# Patient Record
Sex: MALE | Race: WHITE | NOT HISPANIC OR LATINO | Employment: OTHER | URBAN - METROPOLITAN AREA
[De-identification: names, ages, dates, MRNs, and addresses within clinical notes are randomized per-mention and may not be internally consistent; named-entity substitution may affect disease eponyms.]

---

## 2024-07-18 ENCOUNTER — OFFICE VISIT (OUTPATIENT)
Dept: URGENT CARE | Facility: CLINIC | Age: 84
End: 2024-07-18
Payer: MEDICARE

## 2024-07-18 VITALS
SYSTOLIC BLOOD PRESSURE: 184 MMHG | TEMPERATURE: 98 F | DIASTOLIC BLOOD PRESSURE: 72 MMHG | HEART RATE: 67 BPM | WEIGHT: 204 LBS | BODY MASS INDEX: 28.56 KG/M2 | RESPIRATION RATE: 18 BRPM | OXYGEN SATURATION: 98 % | HEIGHT: 71 IN

## 2024-07-18 DIAGNOSIS — S61.213A LACERATION OF LEFT MIDDLE FINGER WITHOUT FOREIGN BODY WITHOUT DAMAGE TO NAIL, INITIAL ENCOUNTER: Primary | ICD-10-CM

## 2024-07-18 PROCEDURE — 99499 UNLISTED E&M SERVICE: CPT | Mod: ,,, | Performed by: FAMILY MEDICINE

## 2024-07-18 PROCEDURE — 12042 INTMD RPR N-HF/GENIT2.6-7.5: CPT | Mod: ,,, | Performed by: FAMILY MEDICINE

## 2024-07-18 RX ORDER — AMITRIPTYLINE HYDROCHLORIDE 10 MG/1
10 TABLET, FILM COATED ORAL NIGHTLY PRN
COMMUNITY

## 2024-07-18 RX ORDER — CEPHALEXIN 500 MG/1
500 CAPSULE ORAL EVERY 6 HOURS
Qty: 28 CAPSULE | Refills: 0 | Status: SHIPPED | OUTPATIENT
Start: 2024-07-18 | End: 2024-07-25

## 2024-07-18 NOTE — PROGRESS NOTES
"Subjective:      Patient ID: Joshua Vicente is a 83 y.o. male.    Vitals:  height is 5' 11" (1.803 m) and weight is 92.5 kg (204 lb). His temperature is 97.6 °F (36.4 °C). His blood pressure is 184/72 (abnormal) and his pulse is 67. His respiration is 18 and oxygen saturation is 98%.     Chief Complaint: Laceration     Patient is a 83 y.o. male who presents to urgent care with complaints of laceration left hand middle finger  x1 hour.  States he was changing a we will in his hand slipped off the tire iron and hit the wheel itself.  States he is able to move the finger without issue.  Tetanus unknown.  Denies any numbness or tingling of the finger.    Laceration       Constitution: Negative.   HENT: Negative.     Neck: neck negative.   Cardiovascular: Negative.    Eyes: Negative.    Respiratory: Negative.     Gastrointestinal: Negative.    Genitourinary: Negative.    Musculoskeletal: Negative.    Skin: Negative.  Positive for laceration.   Allergic/Immunologic: Negative.    Neurological: Negative.    Hematologic/Lymphatic: Negative.       Objective:     Physical Exam   Constitutional: He is oriented to person, place, and time.  Non-toxic appearance. He does not appear ill. No distress.   HENT:   Head: Normocephalic and atraumatic.   Eyes: Conjunctivae are normal.   Abdominal: Normal appearance.   Musculoskeletal:         General: Signs of injury (laceration involving the 3rd digit of the left hand.  Dorsum.  Extending over the PIP joint towards the MCP joint.  There is loss of skin near the MCP joint.  FROM..  Sensation intact.  Capillary refill less than 2 seconds.) present.   Neurological: He is alert and oriented to person, place, and time.   Skin: Skin is not diaphoretic.   Psychiatric: His behavior is normal. Mood, judgment and thought content normal.   Vitals reviewed.         Previous History      Review of patient's allergies indicates:   Allergen Reactions    Codeine     Fluticasone     Iodine (bulk)      " "Light headed, tinnitis, blurry vision    Proton pump inhibitors     1-iododecane Rash       Past Medical History:   Diagnosis Date    Anxiety     Gastroesophageal reflux disease      Current Outpatient Medications   Medication Instructions    amitriptyline (ELAVIL) 10 mg, Oral, Nightly PRN    cephALEXin (KEFLEX) 500 mg, Oral, Every 6 hours     Past Surgical History:   Procedure Laterality Date    APPENDECTOMY      GALLBLADDER SURGERY       Family History   Problem Relation Name Age of Onset    Heart attack Brother         Social History     Tobacco Use    Smoking status: Former     Current packs/day: 0.00     Types: Cigarettes     Quit date: 12/15/2013     Years since quitting: 10.5    Smokeless tobacco: Never   Substance Use Topics    Alcohol use: Yes        Physical Exam      Vital Signs Reviewed   BP (!) 184/72   Pulse 67   Temp 97.6 °F (36.4 °C)   Resp 18   Ht 5' 11" (1.803 m)   Wt 92.5 kg (204 lb)   SpO2 98%   BMI 28.45 kg/m²        Procedures    Procedures     Labs   No results found for this or any previous visit.    Assessment:     1. Laceration of left middle finger without foreign body without damage to nail, initial encounter        Plan:   Medications sent to pharmacy  Start antibiotics today  Keep the dressing clean and dry  Return to clinic on Saturday for re-evaluation and dressing change.  You have been referred to hand Orthopedics but likely you will not see them till next week.  They will call you for an appointment.  Do not get the sutures wet for the next 24 hours, after 24 hours they may get wet but do not submerge them under water at any time  If a dressing was placed over the wound, remove it tomorrow morning  Tylenol or Motrin as needed for pain  Monitor for fever  Monitor for signs of infection, this would include fever, swelling around the wound, increasing redness around the wound, pus drainage from the wound, or increasing pain, seek medical attention immediately if any of these " occur.  As discussed, if you have any difficulty moving the finger, weakness in the finger, numbness or tingling, notify us immediately for referral to the hand specialist.  Return to clinic for suture removal according to the chart below  Wound location     Hands/feet: 12 to 14 days     Return to clinic sooner if there are any concerns       Laceration of left middle finger without foreign body without damage to nail, initial encounter  -     X-Ray Finger 2 or More Views Left; Future; Expected date: 07/18/2024  -     Laceration Repair  -     Ambulatory referral/consult to Orthopedics    Other orders  -     cephALEXin (KEFLEX) 500 MG capsule; Take 1 capsule (500 mg total) by mouth every 6 (six) hours. for 7 days  Dispense: 28 capsule; Refill: 0  -     Cancel: Incision & Drainage

## 2024-07-18 NOTE — PATIENT INSTRUCTIONS
Plan:   Medications sent to pharmacy  Start antibiotics today  Keep the dressing clean and dry  Return to clinic on Saturday for re-evaluation and dressing change.  You have been referred to hand Orthopedics but likely you will not see them till next week.  They will call you for an appointment.  Do not get the sutures wet for the next 24 hours, after 24 hours they may get wet but do not submerge them under water at any time  If a dressing was placed over the wound, remove it tomorrow morning  Tylenol or Motrin as needed for pain  Monitor for fever  Monitor for signs of infection, this would include fever, swelling around the wound, increasing redness around the wound, pus drainage from the wound, or increasing pain, seek medical attention immediately if any of these occur.  As discussed, if you have any difficulty moving the finger, weakness in the finger, numbness or tingling, notify us immediately for referral to the hand specialist.  Return to clinic for suture removal according to the chart below  Wound location     Hands/feet: 12 to 14 days     Return to clinic sooner if there are any concerns

## 2024-07-18 NOTE — PROCEDURES
Laceration Repair    Date/Time: 7/18/2024 12:35 PM    Performed by: Bean Ching MD  Authorized by: Bean Ching MD  Consent Done: Yes  Consent: Verbal consent obtained. Written consent obtained.  Consent given by: patient  Patient understanding: patient states understanding of the procedure being performed  Patient consent: the patient's understanding of the procedure matches consent given  Patient identity confirmed: name and verbally with patient  Body area: upper extremity  Location details: left long finger  Laceration length: 4 cm  Foreign bodies: no foreign bodies  Tendon involvement: none  Nerve involvement: none  Vascular damage: no  Anesthesia: digital block    Anesthesia:  Local Anesthetic: lidocaine 2% without epinephrine  Anesthetic total: 4 mL    Patient sedated: no  Preparation: Patient was prepped and draped in the usual sterile fashion.  Irrigation solution: saline  Irrigation method: jet lavage  Amount of cleaning: extensive  Debridement: none  Degree of undermining: none  Skin closure: 5-0 Prolene  Number of sutures: 9  Technique: simple  Approximation: close  Approximation difficulty: simple  Dressing: Xeroform gauze and non-stick sterile dressing  Patient tolerance: Patient tolerated the procedure well with no immediate complications  Comments: Proximal portion of the laceration near the MCP joint had loss of skin.  Edges were approximated as best as possible but a open wound remained.  This area was covered with Xeroform.  And he will be referred to hand Orthopedics for follow-up given the depth of the wound, location over a joint, and open wound

## 2024-07-20 ENCOUNTER — OFFICE VISIT (OUTPATIENT)
Dept: URGENT CARE | Facility: CLINIC | Age: 84
End: 2024-07-20
Payer: MEDICARE

## 2024-07-20 VITALS
TEMPERATURE: 99 F | HEART RATE: 59 BPM | RESPIRATION RATE: 18 BRPM | WEIGHT: 204 LBS | DIASTOLIC BLOOD PRESSURE: 73 MMHG | BODY MASS INDEX: 28.56 KG/M2 | OXYGEN SATURATION: 98 % | HEIGHT: 71 IN | SYSTOLIC BLOOD PRESSURE: 170 MMHG

## 2024-07-20 DIAGNOSIS — T14.8XXD: Primary | ICD-10-CM

## 2024-07-20 PROCEDURE — 99024 POSTOP FOLLOW-UP VISIT: CPT | Mod: POP,,,

## 2024-07-20 NOTE — PATIENT INSTRUCTIONS
Wound appears to be healing nicely.  No signs of infection at this time.     May cleaned daily with dial soap and water.     Return for signs of infection such as purulent drainage, fever, spreading redness.     Otherwise return in 7-10 days for suture removal.

## 2024-07-20 NOTE — PROGRESS NOTES
Patient came on 07/18 for laceration of the left hand middle finger.  Here today to get dressing changed.  No acute signs of infection noted. Skin well approximated and sutures in place.  Cleaned skin surrounding wound and redress with nonstick dressing.

## 2024-07-25 ENCOUNTER — OFFICE VISIT (OUTPATIENT)
Dept: URGENT CARE | Facility: CLINIC | Age: 84
End: 2024-07-25
Payer: MEDICARE

## 2024-07-25 VITALS
RESPIRATION RATE: 18 BRPM | WEIGHT: 204 LBS | BODY MASS INDEX: 28.56 KG/M2 | DIASTOLIC BLOOD PRESSURE: 71 MMHG | HEIGHT: 71 IN | TEMPERATURE: 98 F | HEART RATE: 54 BPM | OXYGEN SATURATION: 99 % | SYSTOLIC BLOOD PRESSURE: 172 MMHG

## 2024-07-25 DIAGNOSIS — S61.213D LACERATION OF LEFT MIDDLE FINGER WITHOUT FOREIGN BODY WITHOUT DAMAGE TO NAIL, SUBSEQUENT ENCOUNTER: Primary | ICD-10-CM

## 2024-07-25 NOTE — PROGRESS NOTES
"Subjective:      Patient ID: Joshua Vicente is a 83 y.o. male.    Vitals:  height is 5' 11" (1.803 m) and weight is 92.5 kg (204 lb). His temperature is 98.3 °F (36.8 °C). His blood pressure is 172/71 (abnormal) and his pulse is 54 (abnormal). His respiration is 18 and oxygen saturation is 99%.     Chief Complaint: Suture / Staple Removal     Patient is a 83 y.o. male who presents to urgent care for suture removal on left 3rd finger. Sutures were put in on 7/18/2024.  Patient denies any difficulty moving the finger.  Patient denies any numbness or tingling of the finger.  Patient denies any fever or pus drainage from the finger.  States that the swelling has significantly improved.  Has been applying Bactroban to it.  However patient states he has been using the hand working on cars. denies any symptoms.  States hand orthopedic office did call him but he figured he did not need to see them.  No appointment was made.        Constitution: Negative.   HENT: Negative.     Neck: neck negative.   Cardiovascular: Negative.    Eyes: Negative.    Respiratory: Negative.     Gastrointestinal: Negative.    Genitourinary: Negative.    Musculoskeletal: Negative.    Skin: Negative.  Positive for laceration. Negative for erythema (No signs of infection of the left 3rd digit.  All sutures are intact.  Laceration is healing well.  No swelling.  No redness.  No warmth.  Full range of motion.  Sensation intact.  Neurovascular intact.).   Allergic/Immunologic: Negative.    Neurological: Negative.    Hematologic/Lymphatic: Negative.       Objective:     Physical Exam   Constitutional:  Non-toxic appearance. He does not appear ill. No distress.   HENT:   Head: Normocephalic and atraumatic.   Pulmonary/Chest: Effort normal.   Abdominal: Normal appearance.   Neurological: He is alert.   Skin: Skin is not diaphoretic. No erythema (No signs of infection of the left 3rd digit.  All sutures are intact.  Laceration is healing well.  No swelling. " " No redness.  No warmth.  Full range of motion.  Sensation intact.  Neurovascular intact.)   Vitals reviewed.         Previous History      Review of patient's allergies indicates:   Allergen Reactions    Codeine     Fluticasone     Iodine (bulk)      Light headed, tinnitis, blurry vision    Proton pump inhibitors     1-iododecane Rash       Past Medical History:   Diagnosis Date    Anxiety     Gastroesophageal reflux disease      Current Outpatient Medications   Medication Instructions    amitriptyline (ELAVIL) 10 mg, Oral, Nightly PRN    cephALEXin (KEFLEX) 500 mg, Oral, Every 6 hours     Past Surgical History:   Procedure Laterality Date    APPENDECTOMY      GALLBLADDER SURGERY       Family History   Problem Relation Name Age of Onset    Heart attack Brother         Social History     Tobacco Use    Smoking status: Former     Current packs/day: 0.00     Types: Cigarettes     Quit date: 12/15/2013     Years since quitting: 10.6    Smokeless tobacco: Never   Substance Use Topics    Alcohol use: Yes        Physical Exam      Vital Signs Reviewed   BP (!) 172/71   Pulse (!) 54   Temp 98.3 °F (36.8 °C)   Resp 18   Ht 5' 11" (1.803 m)   Wt 92.5 kg (204 lb)   SpO2 99%   BMI 28.45 kg/m²        Procedures    Procedures     Labs   No results found for this or any previous visit.    Assessment:     1. Laceration of left middle finger without foreign body without damage to nail, subsequent encounter        Plan:   It is too early to remove the sutures.  Return to clinic on July 31st for re-evaluation and possible removal of sutures  Continue applying Bactroban  Recommend avoiding strenuous activities with the hand as you may pop the sutures open  Monitor for signs of infection, if you develop fever pus drainage from the wound swelling of the finger redness developing around the finger or increasing pain, return to clinic sooner.           Laceration of left middle finger without foreign body without damage to nail, " subsequent encounter

## 2024-07-25 NOTE — PATIENT INSTRUCTIONS
Plan:   It is too early to remove the sutures.  Return to clinic on July 31st for re-evaluation and possible removal of sutures  Continue applying Bactroban  Recommend avoiding strenuous activities with the hand as you may pop the sutures open  Monitor for signs of infection, if you develop fever pus drainage from the wound swelling of the finger redness developing around the finger or increasing pain, return to clinic sooner.

## 2024-07-31 ENCOUNTER — OFFICE VISIT (OUTPATIENT)
Dept: URGENT CARE | Facility: CLINIC | Age: 84
End: 2024-07-31
Payer: MEDICARE

## 2024-07-31 VITALS
DIASTOLIC BLOOD PRESSURE: 71 MMHG | SYSTOLIC BLOOD PRESSURE: 188 MMHG | TEMPERATURE: 98 F | BODY MASS INDEX: 28.56 KG/M2 | RESPIRATION RATE: 18 BRPM | HEIGHT: 71 IN | OXYGEN SATURATION: 98 % | HEART RATE: 60 BPM | WEIGHT: 204 LBS

## 2024-07-31 DIAGNOSIS — Z48.02 VISIT FOR SUTURE REMOVAL: Primary | ICD-10-CM

## 2024-07-31 PROCEDURE — 99212 OFFICE O/P EST SF 10 MIN: CPT | Mod: ,,, | Performed by: FAMILY MEDICINE

## 2024-07-31 RX ORDER — ROSUVASTATIN CALCIUM 5 MG/1
1 TABLET, COATED ORAL NIGHTLY
COMMUNITY
Start: 2024-07-29

## 2024-07-31 RX ORDER — ASPIRIN 81 MG/1
1 TABLET ORAL EVERY MORNING
COMMUNITY
Start: 2024-07-29

## 2024-07-31 NOTE — PROGRESS NOTES
"Subjective:      Patient ID: Joshua Vicente is a 83 y.o. male.    Vitals:  height is 5' 10.98" (1.803 m) and weight is 92.5 kg (204 lb). His temperature is 97.8 °F (36.6 °C). His blood pressure is 188/71 (abnormal) and his pulse is 60. His respiration is 18 and oxygen saturation is 98%.     Chief Complaint: Suture / Staple Removal     Patient is a 83 y.o. male who presents to urgent care with complaints of suture removal left middle finger.  Sutures were placed 2 weeks ago.  Patient denies any numbness or tingling of the finger.  Denies any difficulty moving the finger.  Denies any weakness of the finger.  Patient states he has been working on his car without issues using that hand and finger although he was advised not to do that.  Alleviating factors include OTC ointment with mild amount of relief. Patient denies discharge, fever.      Suture / Staple Removal        Constitution: Negative.   HENT: Negative.     Neck: neck negative.   Cardiovascular: Negative.    Eyes: Negative.    Respiratory: Negative.     Gastrointestinal: Negative.    Genitourinary: Negative.    Musculoskeletal: Negative.    Skin: Negative.  Negative for erythema (no signs of infection of the left 3rd digit.  Sutures are intact.  Neurovascular intact.  Sensation intact.  Full range of motion.  Sutures were removed in their entirety).   Allergic/Immunologic: Negative.    Neurological: Negative.    Hematologic/Lymphatic: Negative.       Objective:     Physical Exam   Constitutional: He is oriented to person, place, and time.  Non-toxic appearance. He does not appear ill. No distress.   HENT:   Head: Normocephalic and atraumatic.   Pulmonary/Chest: Effort normal. No respiratory distress.   Abdominal: Normal appearance.   Musculoskeletal:         General: No swelling, tenderness or deformity.   Neurological: He is alert and oriented to person, place, and time.   Skin: Skin is not diaphoretic. No bruising and No erythema (no signs of infection of " "the left 3rd digit.  Sutures are intact.  Neurovascular intact.  Sensation intact.  Full range of motion.  Sutures were removed in their entirety)   Psychiatric: His behavior is normal. Mood, judgment and thought content normal.   Vitals reviewed.         Previous History      Review of patient's allergies indicates:   Allergen Reactions    Codeine     Fluticasone     Iodine (bulk)      Light headed, tinnitis, blurry vision    Proton pump inhibitors     1-iododecane Rash       Past Medical History:   Diagnosis Date    Anxiety     Gastroesophageal reflux disease     High cholesterol      Current Outpatient Medications   Medication Instructions    amitriptyline (ELAVIL) 10 mg, Oral, Nightly PRN    aspirin (ECOTRIN) 81 MG EC tablet 1 tablet, Oral, Every morning    rosuvastatin (CRESTOR) 5 MG tablet 1 tablet, Oral, Nightly     Past Surgical History:   Procedure Laterality Date    APPENDECTOMY      GALLBLADDER SURGERY       Family History   Problem Relation Name Age of Onset    Heart attack Brother         Social History     Tobacco Use    Smoking status: Former     Current packs/day: 0.00     Types: Cigarettes     Quit date: 12/15/2013     Years since quitting: 10.6    Smokeless tobacco: Never   Substance Use Topics    Alcohol use: Yes        Physical Exam      Vital Signs Reviewed   BP (!) 188/71   Pulse 60   Temp 97.8 °F (36.6 °C)   Resp 18   Ht 5' 10.98" (1.803 m)   Wt 92.5 kg (204 lb)   SpO2 98%   BMI 28.47 kg/m²        Procedures    Procedures     Labs   No results found for this or any previous visit.    Assessment:     1. Visit for suture removal        Plan:   Contact this clinic with any concerns  The Steri-Strips bandages placed will come off on their own after few days  Recommend using the splint to prevent you from any unusual movement of the finger .  May remove splint after 3 to four-days    Visit for suture removal                    "

## 2024-09-23 ENCOUNTER — OFFICE VISIT (OUTPATIENT)
Dept: URGENT CARE | Facility: CLINIC | Age: 84
End: 2024-09-23
Payer: MEDICARE

## 2024-09-23 VITALS
HEART RATE: 56 BPM | HEIGHT: 70 IN | WEIGHT: 204 LBS | BODY MASS INDEX: 29.2 KG/M2 | OXYGEN SATURATION: 96 % | SYSTOLIC BLOOD PRESSURE: 155 MMHG | DIASTOLIC BLOOD PRESSURE: 86 MMHG | TEMPERATURE: 98 F | RESPIRATION RATE: 20 BRPM

## 2024-09-23 DIAGNOSIS — H53.8 BLURRED VISION: Primary | ICD-10-CM

## 2024-09-23 DIAGNOSIS — R51.9 NONINTRACTABLE HEADACHE, UNSPECIFIED CHRONICITY PATTERN, UNSPECIFIED HEADACHE TYPE: ICD-10-CM

## 2024-09-23 DIAGNOSIS — I10 PRIMARY HYPERTENSION: ICD-10-CM

## 2024-09-23 DIAGNOSIS — M62.81 MUSCLE WEAKNESS: ICD-10-CM

## 2024-09-23 DIAGNOSIS — R50.9 FEVER, UNSPECIFIED FEVER CAUSE: ICD-10-CM

## 2024-09-23 LAB
CTP QC/QA: YES
SARS-COV-2 AG RESP QL IA.RAPID: NEGATIVE

## 2024-09-23 PROCEDURE — 99213 OFFICE O/P EST LOW 20 MIN: CPT | Mod: ,,, | Performed by: PHYSICIAN ASSISTANT

## 2024-09-23 PROCEDURE — 87811 SARS-COV-2 COVID19 W/OPTIC: CPT | Mod: QW,,, | Performed by: PHYSICIAN ASSISTANT

## 2024-09-23 RX ORDER — AMLODIPINE BESYLATE 5 MG/1
5 TABLET ORAL DAILY
COMMUNITY
End: 2024-09-23

## 2024-09-23 RX ORDER — LISINOPRIL 20 MG/1
1 TABLET ORAL EVERY MORNING
COMMUNITY
Start: 2024-09-17

## 2024-09-23 NOTE — PROGRESS NOTES
"Subjective:      Patient ID: Joshua Vicente is a 83 y.o. male.    Vitals:  height is 5' 10" (1.778 m) and weight is 92.5 kg (204 lb). His tympanic temperature is 97.5 °F (36.4 °C). His blood pressure is 155/86 (abnormal) and his pulse is 56 (abnormal). His respiration is 20 and oxygen saturation is 96%.     Chief Complaint: Hypertension     Patient is a 83 y.o. male with a past medical history of CVA hypertension and hyperlipidemia who presents to urgent care with complaints of high blood pressure, HA, Chills, Fever x1 days.  The patient was recently seen by the emergency department in his primary care doctor with symptoms of elevated blood pressure, headache, blurred vision, and intermittent numbness/tingling and muscle weakness to the upper extremities.  The ER started him on amlodipine.  When he followed up with his primary care doctor they switched him to lisinopril.  Patient denies N/V/D.  Talking with him in the exam room the patient realize that he has mistaken rosuvastatin for lisinopril and a has not been taking lisinopril.  He denies any runny nose sore throat cough or shortness a breath.      ROS   Objective:     Physical Exam   Constitutional: He is oriented to person, place, and time.   HENT:   Head: Normocephalic and atraumatic.   Nose: Nose normal.   Cardiovascular: Normal rate, regular rhythm and normal heart sounds.   Pulmonary/Chest: Effort normal and breath sounds normal.   Abdominal: Normal appearance.   Neurological: no focal deficit. He is alert and oriented to person, place, and time. He displays weakness.   Psychiatric: His behavior is normal. Mood normal.   Decreased  strength bilaterally.    Discussed with the patient that due to his age, elevated blood pressure, headache, blurred vision, muscle weakness he should be seen in the emergency department.  Patient declined EMS transport.         Previous History      Review of patient's allergies indicates:   Allergen Reactions    Codeine  " "   Fluticasone     Iodine (bulk)      Light headed, tinnitis, blurry vision    Proton pump inhibitors     1-iododecane Rash       Past Medical History:   Diagnosis Date    Anxiety     Gastroesophageal reflux disease     High cholesterol      Current Outpatient Medications   Medication Instructions    amitriptyline (ELAVIL) 10 mg, Oral, Nightly PRN    aspirin (ECOTRIN) 81 MG EC tablet 1 tablet, Every morning    lisinopriL (PRINIVIL,ZESTRIL) 20 MG tablet 1 tablet, Oral, Every morning    rosuvastatin (CRESTOR) 5 MG tablet 1 tablet, Oral, Nightly     Past Surgical History:   Procedure Laterality Date    APPENDECTOMY      GALLBLADDER SURGERY       Family History   Problem Relation Name Age of Onset    Heart attack Brother         Social History     Tobacco Use    Smoking status: Former     Current packs/day: 0.00     Types: Cigarettes     Quit date: 12/15/2013     Years since quitting: 10.7    Smokeless tobacco: Never   Substance Use Topics    Alcohol use: Yes        Physical Exam      Vital Signs Reviewed   BP (!) 155/86   Pulse (!) 56   Temp 97.5 °F (36.4 °C) (Tympanic)   Resp 20   Ht 5' 10" (1.778 m)   Wt 92.5 kg (204 lb)   SpO2 96%   BMI 29.27 kg/m²        Procedures    Procedures     Labs     Results for orders placed or performed in visit on 09/23/24   SARS Coronavirus 2 Antigen, POCT Manual Read   Result Value Ref Range    SARS Coronavirus 2 Antigen Negative Negative     Acceptable Yes      Assessment:     1. Blurred vision    2. Fever, unspecified fever cause    3. Muscle weakness    4. Nonintractable headache, unspecified chronicity pattern, unspecified headache type    5. Primary hypertension        Plan:       Blurred vision    Fever, unspecified fever cause  -     SARS Coronavirus 2 Antigen, POCT Manual Read    Muscle weakness    Nonintractable headache, unspecified chronicity pattern, unspecified headache type    Primary hypertension        As discussed, it is recommended that you " present to the ER now for further evaluation to prevent a delay in care.   Offered transport via EMS but patient/family declines